# Patient Record
Sex: FEMALE | Race: WHITE | NOT HISPANIC OR LATINO | URBAN - METROPOLITAN AREA
[De-identification: names, ages, dates, MRNs, and addresses within clinical notes are randomized per-mention and may not be internally consistent; named-entity substitution may affect disease eponyms.]

---

## 2020-10-11 ENCOUNTER — INPATIENT (INPATIENT)
Facility: HOSPITAL | Age: 86
LOS: 2 days | Discharge: NURSING, REHAB OR LONG-TERM CARE | DRG: 536 | End: 2020-10-14
Attending: INTERNAL MEDICINE | Admitting: INTERNAL MEDICINE
Payer: MEDICARE

## 2020-10-11 VITALS
OXYGEN SATURATION: 96 % | HEIGHT: 62 IN | DIASTOLIC BLOOD PRESSURE: 76 MMHG | HEART RATE: 76 BPM | RESPIRATION RATE: 16 BRPM | SYSTOLIC BLOOD PRESSURE: 156 MMHG | WEIGHT: 134.92 LBS | TEMPERATURE: 98 F

## 2020-10-11 DIAGNOSIS — M25.552 PAIN IN LEFT HIP: ICD-10-CM

## 2020-10-11 LAB
ALBUMIN SERPL ELPH-MCNC: 4.2 G/DL — SIGNIFICANT CHANGE UP (ref 3.3–5)
ALP SERPL-CCNC: 75 U/L — SIGNIFICANT CHANGE UP (ref 40–120)
ALT FLD-CCNC: 18 U/L — SIGNIFICANT CHANGE UP (ref 10–45)
ANION GAP SERPL CALC-SCNC: 12 MMOL/L — SIGNIFICANT CHANGE UP (ref 5–17)
APPEARANCE UR: CLEAR — SIGNIFICANT CHANGE UP
APTT BLD: 27.6 SEC — SIGNIFICANT CHANGE UP (ref 27.5–35.5)
AST SERPL-CCNC: 23 U/L — SIGNIFICANT CHANGE UP (ref 10–40)
BASE EXCESS BLDV CALC-SCNC: 1.3 MMOL/L — SIGNIFICANT CHANGE UP (ref -2–2)
BASOPHILS # BLD AUTO: 0.02 K/UL — SIGNIFICANT CHANGE UP (ref 0–0.2)
BASOPHILS NFR BLD AUTO: 0.2 % — SIGNIFICANT CHANGE UP (ref 0–2)
BILIRUB SERPL-MCNC: 0.5 MG/DL — SIGNIFICANT CHANGE UP (ref 0.2–1.2)
BILIRUB UR-MCNC: NEGATIVE — SIGNIFICANT CHANGE UP
BUN SERPL-MCNC: 22 MG/DL — SIGNIFICANT CHANGE UP (ref 7–23)
CA-I SERPL-SCNC: 1.18 MMOL/L — SIGNIFICANT CHANGE UP (ref 1.12–1.3)
CALCIUM SERPL-MCNC: 9.3 MG/DL — SIGNIFICANT CHANGE UP (ref 8.4–10.5)
CHLORIDE BLDV-SCNC: 109 MMOL/L — HIGH (ref 96–108)
CHLORIDE SERPL-SCNC: 105 MMOL/L — SIGNIFICANT CHANGE UP (ref 96–108)
CK MB BLD-MCNC: 2.9 % — SIGNIFICANT CHANGE UP (ref 0–3.5)
CK MB CFR SERPL CALC: 3 NG/ML — SIGNIFICANT CHANGE UP (ref 0–3.8)
CK SERPL-CCNC: 102 U/L — SIGNIFICANT CHANGE UP (ref 25–170)
CO2 BLDV-SCNC: 27 MMOL/L — SIGNIFICANT CHANGE UP (ref 22–30)
CO2 SERPL-SCNC: 23 MMOL/L — SIGNIFICANT CHANGE UP (ref 22–31)
COLOR SPEC: SIGNIFICANT CHANGE UP
CREAT SERPL-MCNC: 1.27 MG/DL — SIGNIFICANT CHANGE UP (ref 0.5–1.3)
DIFF PNL FLD: NEGATIVE — SIGNIFICANT CHANGE UP
EOSINOPHIL # BLD AUTO: 0 K/UL — SIGNIFICANT CHANGE UP (ref 0–0.5)
EOSINOPHIL NFR BLD AUTO: 0 % — SIGNIFICANT CHANGE UP (ref 0–6)
ETHANOL SERPL-MCNC: SIGNIFICANT CHANGE UP MG/DL (ref 0–10)
GAS PNL BLDV: 137 MMOL/L — SIGNIFICANT CHANGE UP (ref 135–145)
GAS PNL BLDV: SIGNIFICANT CHANGE UP
GLUCOSE BLDV-MCNC: 128 MG/DL — HIGH (ref 70–99)
GLUCOSE SERPL-MCNC: 133 MG/DL — HIGH (ref 70–99)
GLUCOSE UR QL: NEGATIVE — SIGNIFICANT CHANGE UP
HCO3 BLDV-SCNC: 25 MMOL/L — SIGNIFICANT CHANGE UP (ref 21–29)
HCT VFR BLD CALC: 39.7 % — SIGNIFICANT CHANGE UP (ref 34.5–45)
HCT VFR BLDA CALC: 42 % — SIGNIFICANT CHANGE UP (ref 39–50)
HGB BLD CALC-MCNC: 13.5 G/DL — SIGNIFICANT CHANGE UP (ref 11.5–15.5)
HGB BLD-MCNC: 12.9 G/DL — SIGNIFICANT CHANGE UP (ref 11.5–15.5)
IMM GRANULOCYTES NFR BLD AUTO: 0.4 % — SIGNIFICANT CHANGE UP (ref 0–1.5)
INR BLD: 1.16 RATIO — SIGNIFICANT CHANGE UP (ref 0.88–1.16)
KETONES UR-MCNC: NEGATIVE — SIGNIFICANT CHANGE UP
LACTATE BLDV-MCNC: 1.3 MMOL/L — SIGNIFICANT CHANGE UP (ref 0.7–2)
LEUKOCYTE ESTERASE UR-ACNC: NEGATIVE — SIGNIFICANT CHANGE UP
LIDOCAIN IGE QN: 24 U/L — SIGNIFICANT CHANGE UP (ref 7–60)
LYMPHOCYTES # BLD AUTO: 17.8 % — SIGNIFICANT CHANGE UP (ref 13–44)
LYMPHOCYTES # BLD AUTO: 2.1 K/UL — SIGNIFICANT CHANGE UP (ref 1–3.3)
MCHC RBC-ENTMCNC: 28.4 PG — SIGNIFICANT CHANGE UP (ref 27–34)
MCHC RBC-ENTMCNC: 32.5 GM/DL — SIGNIFICANT CHANGE UP (ref 32–36)
MCV RBC AUTO: 87.4 FL — SIGNIFICANT CHANGE UP (ref 80–100)
MONOCYTES # BLD AUTO: 0.71 K/UL — SIGNIFICANT CHANGE UP (ref 0–0.9)
MONOCYTES NFR BLD AUTO: 6 % — SIGNIFICANT CHANGE UP (ref 2–14)
NEUTROPHILS # BLD AUTO: 8.91 K/UL — HIGH (ref 1.8–7.4)
NEUTROPHILS NFR BLD AUTO: 75.6 % — SIGNIFICANT CHANGE UP (ref 43–77)
NITRITE UR-MCNC: NEGATIVE — SIGNIFICANT CHANGE UP
NRBC # BLD: 0 /100 WBCS — SIGNIFICANT CHANGE UP (ref 0–0)
OTHER CELLS CSF MANUAL: 15 ML/DL — LOW (ref 18–22)
PCO2 BLDV: 40 MMHG — SIGNIFICANT CHANGE UP (ref 35–50)
PH BLDV: 7.42 — SIGNIFICANT CHANGE UP (ref 7.35–7.45)
PH UR: 6.5 — SIGNIFICANT CHANGE UP (ref 5–8)
PLATELET # BLD AUTO: 164 K/UL — SIGNIFICANT CHANGE UP (ref 150–400)
PO2 BLDV: 48 MMHG — HIGH (ref 25–45)
POTASSIUM BLDV-SCNC: 3.9 MMOL/L — SIGNIFICANT CHANGE UP (ref 3.5–5.3)
POTASSIUM SERPL-MCNC: 4.1 MMOL/L — SIGNIFICANT CHANGE UP (ref 3.5–5.3)
POTASSIUM SERPL-SCNC: 4.1 MMOL/L — SIGNIFICANT CHANGE UP (ref 3.5–5.3)
PROT SERPL-MCNC: 6.4 G/DL — SIGNIFICANT CHANGE UP (ref 6–8.3)
PROT UR-MCNC: NEGATIVE — SIGNIFICANT CHANGE UP
PROTHROM AB SERPL-ACNC: 13.8 SEC — HIGH (ref 10.6–13.6)
RBC # BLD: 4.54 M/UL — SIGNIFICANT CHANGE UP (ref 3.8–5.2)
RBC # FLD: 13.6 % — SIGNIFICANT CHANGE UP (ref 10.3–14.5)
SAO2 % BLDV: 82 % — SIGNIFICANT CHANGE UP (ref 67–88)
SARS-COV-2 RNA SPEC QL NAA+PROBE: SIGNIFICANT CHANGE UP
SODIUM SERPL-SCNC: 140 MMOL/L — SIGNIFICANT CHANGE UP (ref 135–145)
SP GR SPEC: 1.01 — SIGNIFICANT CHANGE UP (ref 1.01–1.02)
TROPONIN T, HIGH SENSITIVITY RESULT: 22 NG/L — SIGNIFICANT CHANGE UP (ref 0–51)
UROBILINOGEN FLD QL: NEGATIVE — SIGNIFICANT CHANGE UP
WBC # BLD: 11.79 K/UL — HIGH (ref 3.8–10.5)
WBC # FLD AUTO: 11.79 K/UL — HIGH (ref 3.8–10.5)

## 2020-10-11 PROCEDURE — 99285 EMERGENCY DEPT VISIT HI MDM: CPT

## 2020-10-11 PROCEDURE — 76705 ECHO EXAM OF ABDOMEN: CPT | Mod: 26

## 2020-10-11 PROCEDURE — 71045 X-RAY EXAM CHEST 1 VIEW: CPT | Mod: 26

## 2020-10-11 PROCEDURE — 74176 CT ABD & PELVIS W/O CONTRAST: CPT | Mod: 26

## 2020-10-11 PROCEDURE — 99223 1ST HOSP IP/OBS HIGH 75: CPT

## 2020-10-11 PROCEDURE — 76377 3D RENDER W/INTRP POSTPROCES: CPT | Mod: 26

## 2020-10-11 PROCEDURE — 71250 CT THORAX DX C-: CPT | Mod: 26

## 2020-10-11 PROCEDURE — 73523 X-RAY EXAM HIPS BI 5/> VIEWS: CPT | Mod: 26

## 2020-10-11 PROCEDURE — 72192 CT PELVIS W/O DYE: CPT | Mod: 26,59

## 2020-10-11 PROCEDURE — 73552 X-RAY EXAM OF FEMUR 2/>: CPT | Mod: 26,LT

## 2020-10-11 RX ORDER — EZETIMIBE 10 MG/1
0 TABLET ORAL
Qty: 0 | Refills: 3 | DISCHARGE

## 2020-10-11 RX ORDER — SODIUM CHLORIDE 9 MG/ML
1000 INJECTION INTRAMUSCULAR; INTRAVENOUS; SUBCUTANEOUS ONCE
Refills: 0 | Status: COMPLETED | OUTPATIENT
Start: 2020-10-11 | End: 2020-10-11

## 2020-10-11 RX ORDER — ACETAMINOPHEN 500 MG
650 TABLET ORAL ONCE
Refills: 0 | Status: COMPLETED | OUTPATIENT
Start: 2020-10-11 | End: 2020-10-11

## 2020-10-11 RX ORDER — ROSUVASTATIN CALCIUM 5 MG/1
0 TABLET ORAL
Qty: 0 | Refills: 0 | DISCHARGE

## 2020-10-11 RX ORDER — AMLODIPINE BESYLATE 2.5 MG/1
0 TABLET ORAL
Qty: 0 | Refills: 1 | DISCHARGE

## 2020-10-11 RX ORDER — ACETAMINOPHEN 500 MG
650 TABLET ORAL EVERY 6 HOURS
Refills: 0 | Status: DISCONTINUED | OUTPATIENT
Start: 2020-10-11 | End: 2020-10-14

## 2020-10-11 RX ORDER — ACETAMINOPHEN 500 MG
975 TABLET ORAL ONCE
Refills: 0 | Status: COMPLETED | OUTPATIENT
Start: 2020-10-11 | End: 2020-10-11

## 2020-10-11 RX ORDER — AMIODARONE HYDROCHLORIDE 400 MG/1
0 TABLET ORAL
Qty: 0 | Refills: 0 | DISCHARGE

## 2020-10-11 RX ORDER — AMIODARONE HYDROCHLORIDE 400 MG/1
200 TABLET ORAL DAILY
Refills: 0 | Status: DISCONTINUED | OUTPATIENT
Start: 2020-10-11 | End: 2020-10-12

## 2020-10-11 RX ORDER — EZETIMIBE 10 MG/1
1 TABLET ORAL
Qty: 0 | Refills: 3 | DISCHARGE

## 2020-10-11 RX ORDER — OLMESARTAN MEDOXOMIL 5 MG/1
0 TABLET, FILM COATED ORAL
Qty: 0 | Refills: 3 | DISCHARGE

## 2020-10-11 RX ADMIN — SODIUM CHLORIDE 1000 MILLILITER(S): 9 INJECTION INTRAMUSCULAR; INTRAVENOUS; SUBCUTANEOUS at 21:58

## 2020-10-11 RX ADMIN — SODIUM CHLORIDE 1000 MILLILITER(S): 9 INJECTION INTRAMUSCULAR; INTRAVENOUS; SUBCUTANEOUS at 19:00

## 2020-10-11 RX ADMIN — Medication 975 MILLIGRAM(S): at 14:55

## 2020-10-11 RX ADMIN — Medication 650 MILLIGRAM(S): at 22:38

## 2020-10-11 RX ADMIN — Medication 975 MILLIGRAM(S): at 20:22

## 2020-10-11 NOTE — H&P ADULT - PROBLEM SELECTOR PLAN 3
Holding home BP medications for now given hypotension. Per pt's son had been recently changed to olmesartan so may be that this is too much for her to tolerate.  Continue amiodarone for ? arrythmia

## 2020-10-11 NOTE — ED PROVIDER NOTE - ATTENDING CONTRIBUTION TO CARE
------------ATTENDING NOTE------------   pt w/ son (easily translating) c/o mechanical fall sitting in chair, landed on her L buttock, no head injury/LOC or HA/AMS, c/o mild tenderness in L hip/buttock worse w/ active ROM, was unable to ambulate due to pain, no additional injuries/complaints, nml neuro exam and at her baseline status per son, CTL spine clinically cleared, benign stable chest w/o tenderness/crepitus/distress, soft benign abd, nml cardiac exam, equal distal pulses, no external signs of trauma, pelvis stable, L buttock/hip tender w/o deformity, nvi w/ bcr distally, son is physician and wants limited w/u, awaiting imaging and close reassessments -->  - Robert Kendall MD    --------------------------------------------- ------------ATTENDING NOTE------------   pt w/ son (easily translating) c/o mechanical fall sitting in chair, landed on her L buttock, no head injury/LOC or HA/AMS, c/o mild tenderness in L hip/buttock worse w/ active ROM, was unable to ambulate due to pain, no additional injuries/complaints, nml neuro exam and at her baseline status per son, CTL spine clinically cleared, benign stable chest w/o tenderness/crepitus/distress, soft benign abd, nml cardiac exam, equal distal pulses, no external signs of trauma, pelvis stable, L buttock/hip tender w/o deformity, nvi w/ bcr distally, son is physician and wants limited w/u, awaiting imaging and close reassessments --> ED SIgn Out 15:45, stable, pain improved, pending imaging and reassessments for dispo (son would like to take home if no operative intervention.  - Robert Kendall MD    ---------------------------------------------

## 2020-10-11 NOTE — H&P ADULT - PROBLEM SELECTOR PLAN 2
Had episode of hypotension in setting of significant pain with attempting to ambulate. However, did also report possibile lightheadedness immediately prior to the fall. Has been having episodes of weakness/lightheadedness for some time which she has been undergoing workup with her cardiologist with negative findings to date. Unclear if this represents a vasovagal episode vs arrythmia vs orthostatic hypotension as patient had attempted to get up to leave.  Monitor on telemetry for now  No symptoms or imaging findings c/w acute infection and patient's UA clean, would doubt infectious etiology. Leukocytosis likely reactive to fracture.

## 2020-10-11 NOTE — ED PROVIDER NOTE - NSFOLLOWUPINSTRUCTIONS_ED_ALL_ED_FT
You were found to have a fracture of your superior and inferior pubic rami.   You can take tylenol as needed for pain  Please follow up with the orthopedic doctor  Please return if you have worsening pain, difficulty walking, fevers or further concerns. You were found to have a fracture of your superior and inferior pubic rami.   You can take tylenol as needed for pain  Please follow up with the orthopedic doctor and your primary care doctor.  Please return if you have worsening pain, difficulty walking, fevers or further concerns.

## 2020-10-11 NOTE — H&P ADULT - ASSESSMENT
89yo F Hx HTN, HLD, GERD, IBS, s/p TAVR presenting with complaints of R hip pain s/p fall. Patient reports that immediately prior to fall she believes that she felt a little lightheaded but also thinks that she "missed" a chair she was trying to sit on

## 2020-10-11 NOTE — ED ADULT NURSE NOTE - OBJECTIVE STATEMENT
89 Year old female presents to the ED via wheelchair from waiting room with son s/p mechanical fall while attempting to sit in chair, complaining of L hip pain. PMH HTN, HLD, tavr 4 years ago. Pt. denies taking anything for pain prior to arrival. Pt. is A&Ox4, states pain is only in L hip. Denies dizziness, fever/chills, chest pain, SOB, nausea, vomiting, recent sick contacts. Denies dysuria. Patient undressed and placed into gown, call bell in hand and side rails up with bed in lowest position for safety. blanket provided. Comfort and safety provided.

## 2020-10-11 NOTE — ED PROVIDER NOTE - PMH
Fx distal radius NEC-closed    GERD (gastroesophageal reflux disease)    Hyperlipidemia    Hypertension    IBS (irritable bowel syndrome)

## 2020-10-11 NOTE — ED PROVIDER NOTE - CARE PROVIDER_API CALL
Aniket Darnell  ORTHOPAEDIC SURGERY  [address], [address]  [place] [address]  Phone: [phone]  Fax: [phone]  Follow Up Time:

## 2020-10-11 NOTE — H&P ADULT - PROBLEM SELECTOR PLAN 1
2/2 pubic rami fractures  Pain control with tylenol-reports reasonably good pain control with this though did have significant pain when attempting to ambulate earlier  Patient and family reluctant to use stronger pain medications at this time so will hold off  PT consult

## 2020-10-11 NOTE — ED PROVIDER NOTE - PROGRESS NOTE DETAILS
jodee kaufman PGY2: pt with mild improvement of pain, xrays negative. will obtain CT to better evaluate the pelvis. discussed with patient and son who are in agreement. Attending Mariya Melgoza: pt signed out to me. ct shows evidence of superior and inferior pubic rami fracture. d/w family who wants to take pt home has help and feels can care for her. will attempt to ambulate Attending Mariya Melgoza: pt with some difficulty wit nadine. d/w pt admission for PT and monitoring. pt and family prefer to take her home and will have someone with her. understand can return at anytime Attending Mariya Melgoza: pt was getting ready to leave and became lightheaded and diaphoretic and did not feel well. repeat blood pressure in 60's. placed on monitor. pocus performed showing hyperdynamic LVEF, flat ivc and no free fluid.started on ivf with improvement will send labs

## 2020-10-11 NOTE — H&P ADULT - HISTORY OF PRESENT ILLNESS
Patient interviewed with use of pacific  (Maltese) and additional collateral by son via phone at patient's request. 89yo F Hx HTN, HLD, GERD, IBS, s/p TAVR presenting with complaints of R hip pain s/p fall. Patient reports that immediately prior to fall she believes that she felt a little lightheaded but also thinks that she "missed" a chair she was trying to sit on. Additional collateral obtained from patient's son Dr. Paul Valverde. No known hx of any specific arrythmia but patient has been having episodes of fatigue and lightheadedness that have been occurring on and off for some time now. Had been started on amiodarone by her cardiologist prophylactically but these episodes had still recurred a few months ago. At that time patient had 72 hour holter monitoring done as well as an echocardiogram which did not reveal any findings to explain the patient's symptoms. Patient reports that she sometimes has vague chest discomfort that she cannot clarify.

## 2020-10-11 NOTE — H&P ADULT - NSHPPHYSICALEXAM_GEN_ALL_CORE
Patient refusing repeat physical exam at this time.    ER provider physical examination per records as below:   · CONSTITUTIONAL: Well appearing, awake, alert, oriented to person, place, time/situation and in no apparent distress.  · EYES: Clear bilaterally, pupils equal, round and reactive to light.  · CARDIAC: Normal rate, regular rhythm.  Heart sounds S1, S2.  No murmurs, rubs or gallops. no chest wall ttp  · RESPIRATORY: Breath sounds clear and equal bilaterally.  · GASTROINTESTINAL: Abdomen soft, non-tender, no rebound or guarding.  · MUSCULOSKELETAL: Spine appears normal, decreased ROM of the L hip 2/2 pain, mild tenderness around the hip/buttock, no deformities or crepitus noted. 2+ pulses, sensation intact. no redness or bruising present.  · NEUROLOGICAL: Alert and oriented, no focal deficits, no motor or sensory deficits.  · SKIN: Skin normal color for race, warm, dry and intact. No evidence of rash.

## 2020-10-11 NOTE — H&P ADULT - NSICDXPASTSURGICALHX_GEN_ALL_CORE_FT
PAST SURGICAL HISTORY:  H/O partial thyroidectomy 1970 benign nodules    knee replacement right 2006    S/P TAVR (transcatheter aortic valve replacement)

## 2020-10-11 NOTE — ED PROVIDER NOTE - OBJECTIVE STATEMENT
89yo F 89yo F Hx HTN, HLD, GERD presenting with complaints of 89yo F Hx HTN, HLD, GERD presenting with complaints of R hip pain s/p fall. pt went to sit down in her chair and she instead landed on the ground. reporting pain in her R groin 89yo F Hx HTN, HLD, GERD presenting with complaints of R hip pain s/p fall. pt went to sit down in her chair and she instead landed on the ground. reporting pain in her R groin, unable to ambulate 2/2 pain. no other complaints, didn't hit her head, no LOC. no blood thinners. no back or neck pain. denies sob, cp, abd pain, n/v.

## 2020-10-11 NOTE — ED PROVIDER NOTE - PATIENT PORTAL LINK FT
You can access the FollowMyHealth Patient Portal offered by [facility] by registering at the following website: [email or web address]. By joining [employer]’s FollowMyHealth portal, you will also be able to view your health information using other applications (apps) compatible with our system.

## 2020-10-11 NOTE — ED ADULT NURSE NOTE - NSIMPLEMENTINTERV_GEN_ALL_ED
Implemented All Fall with Harm Risk Interventions:  [place]ang to call system. Call bell, personal items and telephone within reach. Instruct patient to call for assistance. Room bathroom lighting operational. Non-slip footwear when patient is off stretcher. Physically safe environment: no spills, clutter or unnecessary equipment. Stretcher in lowest position, wheels locked, appropriate side rails in place. Provide visual cue, wrist band, yellow gown, etc. Monitor gait and stability. Monitor for mental status changes and reorient to person, place, and time. Review medications for side effects contributing to fall risk. Reinforce activity limits and safety measures with patient and family. Provide visual clues: red socks.

## 2020-10-11 NOTE — H&P ADULT - NSHPLABSRESULTS_GEN_ALL_CORE
Labs personally reviewed:                        12.9   11.79 )-----------( 164      ( 11 Oct 2020 18:42 )             39.7       10-11    140  |  105  |  22  ----------------------------<  133<H>  4.1   |  23  |  1.27    Ca    9.3      11 Oct 2020 18:42  Mg     1.8     10-11    TPro  6.4  /  Alb  4.2  /  TBili  0.5  /  DBili  x   /  AST  23  /  ALT  18  /  AlkPhos  75  10-11      CARDIAC MARKERS ( 11 Oct 2020 19:15 )  x     / x     / 102 U/L / x     / 3.0 ng/mL        LIVER FUNCTIONS - ( 11 Oct 2020 18:42 )  Alb: 4.2 g/dL / Pro: 6.4 g/dL / ALK PHOS: 75 U/L / ALT: 18 U/L / AST: 23 U/L / GGT: x             PT/INR - ( 11 Oct 2020 19:15 )   PT: 13.8 sec;   INR: 1.16 ratio         PTT - ( 11 Oct 2020 19:15 )  PTT:27.6 sec    Urinalysis Basic - ( 11 Oct 2020 19:28 )    Color: Light Yellow / Appearance: Clear / S.010 / pH: x  Gluc: x / Ketone: Negative  / Bili: Negative / Urobili: Negative   Blood: x / Protein: Negative / Nitrite: Negative   Leuk Esterase: Negative / RBC: x / WBC x   Sq Epi: x / Non Sq Epi: x / Bacteria: x    Imaging reviewed: Slightly displaced fractures of the left superior and inferior pubic rami.     EKG:

## 2020-10-11 NOTE — ED PROVIDER NOTE - MUSCULOSKELETAL, MLM
Spine appears normal, decreased ROM of the L hip 2/2 pain, mild tenderness around the hip/buttock, no deformities or crepitus noted. 2+ pulses, sensation intact. no redness or bruising present.

## 2020-10-11 NOTE — ED ADULT NURSE REASSESSMENT NOTE - NS ED NURSE REASSESS COMMENT FT1
New diaper and janene placed under PT. Side rails up. Cardiac monitor in place. Son at bedside.
PT returned form CT scan. Mahnaz fit attached to PT and suction.
ED Resident Adilene made aware PT has increased left hip pain.
Patient is able to bear weight with assistance. MD discussed with patient and patient's son results of xrays and CTs. MD discussed with son at bedside that patient will need assistance at home. Son states he will have walker and wheelchair ready at home and will be caring for her. Patient and Patient's son state readiness for discharge.
PT states she feels comfortable.
Report received from ELIJAH Lamas. PT A/O x3. PT son at bedside. Cardiac monitor placed. VS within normal limits. Call bell at bedside.

## 2020-10-12 DIAGNOSIS — Z95.2 PRESENCE OF PROSTHETIC HEART VALVE: Chronic | ICD-10-CM

## 2020-10-12 DIAGNOSIS — M25.552 PAIN IN LEFT HIP: ICD-10-CM

## 2020-10-12 DIAGNOSIS — I10 ESSENTIAL (PRIMARY) HYPERTENSION: ICD-10-CM

## 2020-10-12 DIAGNOSIS — I95.9 HYPOTENSION, UNSPECIFIED: ICD-10-CM

## 2020-10-12 DIAGNOSIS — E78.5 HYPERLIPIDEMIA, UNSPECIFIED: ICD-10-CM

## 2020-10-12 LAB
ANION GAP SERPL CALC-SCNC: 11 MMOL/L — SIGNIFICANT CHANGE UP (ref 5–17)
BUN SERPL-MCNC: 19 MG/DL — SIGNIFICANT CHANGE UP (ref 7–23)
CALCIUM SERPL-MCNC: 9.7 MG/DL — SIGNIFICANT CHANGE UP (ref 8.4–10.5)
CHLORIDE SERPL-SCNC: 105 MMOL/L — SIGNIFICANT CHANGE UP (ref 96–108)
CO2 SERPL-SCNC: 24 MMOL/L — SIGNIFICANT CHANGE UP (ref 22–31)
CREAT SERPL-MCNC: 1.15 MG/DL — SIGNIFICANT CHANGE UP (ref 0.5–1.3)
GLUCOSE SERPL-MCNC: 101 MG/DL — HIGH (ref 70–99)
HCT VFR BLD CALC: 37.8 % — SIGNIFICANT CHANGE UP (ref 34.5–45)
HGB BLD-MCNC: 11.8 G/DL — SIGNIFICANT CHANGE UP (ref 11.5–15.5)
MCHC RBC-ENTMCNC: 27.8 PG — SIGNIFICANT CHANGE UP (ref 27–34)
MCHC RBC-ENTMCNC: 31.2 GM/DL — LOW (ref 32–36)
MCV RBC AUTO: 89.2 FL — SIGNIFICANT CHANGE UP (ref 80–100)
NRBC # BLD: 0 /100 WBCS — SIGNIFICANT CHANGE UP (ref 0–0)
PLATELET # BLD AUTO: 128 K/UL — LOW (ref 150–400)
POTASSIUM SERPL-MCNC: 4.1 MMOL/L — SIGNIFICANT CHANGE UP (ref 3.5–5.3)
POTASSIUM SERPL-SCNC: 4.1 MMOL/L — SIGNIFICANT CHANGE UP (ref 3.5–5.3)
RBC # BLD: 4.24 M/UL — SIGNIFICANT CHANGE UP (ref 3.8–5.2)
RBC # FLD: 13.6 % — SIGNIFICANT CHANGE UP (ref 10.3–14.5)
SARS-COV-2 IGG SERPL QL IA: NEGATIVE — SIGNIFICANT CHANGE UP
SARS-COV-2 IGM SERPL IA-ACNC: <3.8 AU/ML — SIGNIFICANT CHANGE UP
SODIUM SERPL-SCNC: 140 MMOL/L — SIGNIFICANT CHANGE UP (ref 135–145)
WBC # BLD: 6.88 K/UL — SIGNIFICANT CHANGE UP (ref 3.8–10.5)
WBC # FLD AUTO: 6.88 K/UL — SIGNIFICANT CHANGE UP (ref 3.8–10.5)

## 2020-10-12 RX ORDER — AMLODIPINE BESYLATE 2.5 MG/1
2.5 TABLET ORAL ONCE
Refills: 0 | Status: COMPLETED | OUTPATIENT
Start: 2020-10-12 | End: 2020-10-12

## 2020-10-12 RX ORDER — SIMETHICONE 80 MG/1
1 TABLET, CHEWABLE ORAL
Qty: 0 | Refills: 0 | DISCHARGE

## 2020-10-12 RX ORDER — LOSARTAN POTASSIUM 100 MG/1
50 TABLET, FILM COATED ORAL DAILY
Refills: 0 | Status: DISCONTINUED | OUTPATIENT
Start: 2020-10-12 | End: 2020-10-14

## 2020-10-12 RX ORDER — SIMETHICONE 80 MG/1
80 TABLET, CHEWABLE ORAL
Refills: 0 | Status: DISCONTINUED | OUTPATIENT
Start: 2020-10-12 | End: 2020-10-14

## 2020-10-12 RX ORDER — POLYETHYLENE GLYCOL 3350 17 G/17G
17 POWDER, FOR SOLUTION ORAL DAILY
Refills: 0 | Status: DISCONTINUED | OUTPATIENT
Start: 2020-10-12 | End: 2020-10-14

## 2020-10-12 RX ORDER — AMIODARONE HYDROCHLORIDE 400 MG/1
1 TABLET ORAL
Qty: 0 | Refills: 0 | DISCHARGE

## 2020-10-12 RX ORDER — AMLODIPINE BESYLATE 2.5 MG/1
1 TABLET ORAL
Qty: 0 | Refills: 1 | DISCHARGE

## 2020-10-12 RX ORDER — OLMESARTAN MEDOXOMIL 5 MG/1
1 TABLET, FILM COATED ORAL
Qty: 0 | Refills: 3 | DISCHARGE

## 2020-10-12 RX ORDER — AMIODARONE HYDROCHLORIDE 400 MG/1
0.5 TABLET ORAL
Qty: 0 | Refills: 0 | DISCHARGE

## 2020-10-12 RX ORDER — ASPIRIN/CALCIUM CARB/MAGNESIUM 324 MG
1 TABLET ORAL
Qty: 0 | Refills: 0 | DISCHARGE

## 2020-10-12 RX ORDER — AMIODARONE HYDROCHLORIDE 400 MG/1
100 TABLET ORAL DAILY
Refills: 0 | Status: DISCONTINUED | OUTPATIENT
Start: 2020-10-12 | End: 2020-10-14

## 2020-10-12 RX ORDER — ATORVASTATIN CALCIUM 80 MG/1
20 TABLET, FILM COATED ORAL AT BEDTIME
Refills: 0 | Status: DISCONTINUED | OUTPATIENT
Start: 2020-10-12 | End: 2020-10-13

## 2020-10-12 RX ORDER — HEPARIN SODIUM 5000 [USP'U]/ML
5000 INJECTION INTRAVENOUS; SUBCUTANEOUS EVERY 12 HOURS
Refills: 0 | Status: DISCONTINUED | OUTPATIENT
Start: 2020-10-12 | End: 2020-10-14

## 2020-10-12 RX ORDER — ROSUVASTATIN CALCIUM 5 MG/1
1 TABLET ORAL
Qty: 0 | Refills: 0 | DISCHARGE

## 2020-10-12 RX ORDER — ASPIRIN/CALCIUM CARB/MAGNESIUM 324 MG
81 TABLET ORAL DAILY
Refills: 0 | Status: DISCONTINUED | OUTPATIENT
Start: 2020-10-12 | End: 2020-10-14

## 2020-10-12 RX ORDER — LIDOCAINE 4 G/100G
1 CREAM TOPICAL ONCE
Refills: 0 | Status: COMPLETED | OUTPATIENT
Start: 2020-10-12 | End: 2020-10-12

## 2020-10-12 RX ORDER — LINACLOTIDE 145 UG/1
1 CAPSULE, GELATIN COATED ORAL
Qty: 0 | Refills: 0 | DISCHARGE

## 2020-10-12 RX ADMIN — LIDOCAINE 1 PATCH: 4 CREAM TOPICAL at 20:58

## 2020-10-12 RX ADMIN — ATORVASTATIN CALCIUM 20 MILLIGRAM(S): 80 TABLET, FILM COATED ORAL at 20:58

## 2020-10-12 RX ADMIN — AMLODIPINE BESYLATE 2.5 MILLIGRAM(S): 2.5 TABLET ORAL at 20:58

## 2020-10-12 RX ADMIN — Medication 650 MILLIGRAM(S): at 21:30

## 2020-10-12 RX ADMIN — Medication 650 MILLIGRAM(S): at 20:58

## 2020-10-12 RX ADMIN — AMIODARONE HYDROCHLORIDE 100 MILLIGRAM(S): 400 TABLET ORAL at 06:12

## 2020-10-12 RX ADMIN — LOSARTAN POTASSIUM 50 MILLIGRAM(S): 100 TABLET, FILM COATED ORAL at 20:58

## 2020-10-12 RX ADMIN — SIMETHICONE 80 MILLIGRAM(S): 80 TABLET, CHEWABLE ORAL at 20:58

## 2020-10-12 RX ADMIN — HEPARIN SODIUM 5000 UNIT(S): 5000 INJECTION INTRAVENOUS; SUBCUTANEOUS at 05:19

## 2020-10-12 RX ADMIN — Medication 81 MILLIGRAM(S): at 13:28

## 2020-10-12 NOTE — PROGRESS NOTE ADULT - ASSESSMENT
89yo Female Hx HTN, HLD, GERD, IBS, s/p TAVR presenting with complaints of R hip pain s/p fall.       Problem/Plan - 1:  ·  Problem: Hip pain, left.  Plan: 2/2 pubic rami fractures  analgesics  pt  ortho eval  wbat     Problem/Plan - 2:  ·  Problem: Hypotension,/vasovagal while trying to get  up sec to pain       Problem/Plan - 3:  ·  Problem: Essential hypertension.   c/w htn meds and monitor     Problem/Plan - 4:  ·  Problem: Hyperlipidemia, unspecified hyperlipidemia type.  c/w meds      dvt proph

## 2020-10-12 NOTE — PHYSICAL THERAPY INITIAL EVALUATION ADULT - PERTINENT HX OF CURRENT PROBLEM, REHAB EVAL
Pt is a 89 y/o female admitted to [facility] on 10/11/20  Hx HTN, HLD, GERD, IBS, s/p TAVR presenting with complaints of R hip pain s/p fall. Patient reports that immediately prior to fall she believes that she felt a little lightheaded but also thinks that she "missed" a chair she was trying to sit on.

## 2020-10-12 NOTE — PATIENT PROFILE ADULT - PATIENT/CAREGIVER OFFERED  INTERPRETER SERVICES
yes Urine Pregnancy Test Result: negative Lot # (Optional): [id number] Detail Level: None Performed By: Marilyn Parsons MA Expiration Date (Optional): 9/30/2020

## 2020-10-12 NOTE — PROGRESS NOTE ADULT - SUBJECTIVE AND OBJECTIVE BOX
CHIEF COMPLAINT:Patient is a 89y old  Female who presents with a chief complaint of Hypotension, pubic rami fractures (11 Oct 2020 21:16)    	        PAST MEDICAL & SURGICAL HISTORY:  Hyperlipidemia    Hypertension    GERD (gastroesophageal reflux disease)    IBS (irritable bowel syndrome)    Fx distal radius NEC-closed    S/P TAVR (transcatheter aortic valve replacement)    H/O partial thyroidectomy  1970 benign nodules    knee replacement  right 2006            REVIEW OF SYSTEMS:  CONSTITUTIONAL: No fever, weight loss, or fatigue  EYES: No eye pain, visual disturbances, or discharge  NECK: No pain or stiffness  RESPIRATORY: No cough, wheezing, chills or hemoptysis; No Shortness of Breath  CARDIOVASCULAR: No chest pain, palpitations, passing out, dizziness, or leg swelling  GASTROINTESTINAL: No abdominal or epigastric pain. No nausea, vomiting, or hematemesis; No diarrhea or constipation. No melena or hematochezia.  GENITOURINARY: No dysuria, frequency, hematuria, or incontinence  NEUROLOGICAL: No headaches, memory loss,   c/o l groin pain     Medications:  MEDICATIONS  (STANDING):  aMIOdarone    Tablet 100 milliGRAM(s) Oral daily  aspirin enteric coated 81 milliGRAM(s) Oral daily  heparin   Injectable 5000 Unit(s) SubCutaneous every 12 hours    MEDICATIONS  (PRN):  acetaminophen   Tablet .. 650 milliGRAM(s) Oral every 6 hours PRN Mild Pain (1 - 3), Moderate Pain (4 - 6)    	    PHYSICAL EXAM:  T(C): 36.7 (10-12-20 @ 08:33), Max: 36.9 (10-11-20 @ 14:14)  HR: 77 (10-12-20 @ 08:33) (72 - 84)  BP: 147/68 (10-12-20 @ 08:33) (62/35 - 160/72)  RR: 18 (10-12-20 @ 08:33) (14 - 22)  SpO2: 96% (10-12-20 @ 08:33) (93% - 100%)  Wt(kg): --  I&O's Summary      Appearance: Normal	  HEENT:   Normal oral mucosa, PERRL, EOMI	  Lymphatic: No lymphadenopathy  Cardiovascular: Normal S1 S2, No JVD, No murmurs, No edema  Respiratory: Lungs clear to auscultation	  Psychiatry: A & O x 3, Mood & affect appropriate  Gastrointestinal:  Soft, Non-tender, + BS	  Skin: No rashes, No ecchymoses, No cyanosis	  Neurologic: Non-focal  Extremities: dec rom llex sec to pain groin   TELEMETRY: 	    ECG:  	  RADIOLOGY:  OTHER: 	  	  LABS:	 	    CARDIAC MARKERS:  CARDIAC MARKERS ( 11 Oct 2020 19:15 )  x     / x     / 102 U/L / x     / 3.0 ng/mL                                11.8   6.88  )-----------( 128      ( 12 Oct 2020 05:28 )             37.8     10-12    140  |  105  |  19  ----------------------------<  101<H>  4.1   |  24  |  1.15    Ca    9.7      12 Oct 2020 05:28  Mg     1.8     10-11    TPro  6.4  /  Alb  4.2  /  TBili  0.5  /  DBili  x   /  AST  23  /  ALT  18  /  AlkPhos  75  10-11    proBNP:   Lipid Profile:   HgA1c:   TSH:

## 2020-10-12 NOTE — PHYSICAL THERAPY INITIAL EVALUATION ADULT - ADDITIONAL COMMENTS
Pt lives alone in  a private house with one flight of steps to enter. Pt was Ind with all ADLs and amb without AD.

## 2020-10-12 NOTE — PHYSICAL THERAPY INITIAL EVALUATION ADULT - PRECAUTIONS/LIMITATIONS, REHAB EVAL
No known hx of any specific arrythmia but patient has been having episodes of fatigue and lightheadedness that have been occurring on and off for some time now. Had been started on amiodarone by her cardiologist prophylactically but these episodes had still recurred a few months ago. At that time patient had 72 hour holter monitoring done as well as an echocardiogram which did not reveal any findings to explain the patient's symptoms. Patient reports that she sometimes has vague chest discomfort that she cannot clarify.  CT pelvis:  Slightly displaced fractures of the left superior and inferior pubic rami.

## 2020-10-12 NOTE — CONSULT NOTE ADULT - SUBJECTIVE AND OBJECTIVE BOX
89y Female admitted to [facility] s/p syncopal fall c/o L groin pain.  Localizes pain deep in the left groin. Patient denies radiation of pain. Patient denies numbness/tingling/burning in the LLE. No other bone/joint complaints. Patient is a community ambulator at baseline with assistive devices. Patient has no issues w/ ADLs/IADLs.     PAST MEDICAL & SURGICAL HISTORY:  Hyperlipidemia    Hypertension    GERD (gastroesophageal reflux disease)    IBS (irritable bowel syndrome)    Fx distal radius NEC-closed    S/P TAVR (transcatheter aortic valve replacement)    H/O partial thyroidectomy  1970 benign nodules    knee replacement  right 2006      MEDICATIONS  (STANDING):  aMIOdarone    Tablet 100 milliGRAM(s) Oral daily  amLODIPine   Tablet 2.5 milliGRAM(s) Oral once  aspirin enteric coated 81 milliGRAM(s) Oral daily  atorvastatin 20 milliGRAM(s) Oral at bedtime  heparin   Injectable 5000 Unit(s) SubCutaneous every 12 hours  lidocaine   Patch 1 Patch Transdermal once  losartan 50 milliGRAM(s) Oral daily  polyethylene glycol 3350 17 Gram(s) Oral daily  simethicone 80 milliGRAM(s) Chew two times a day    MEDICATIONS  (PRN):  acetaminophen   Tablet .. 650 milliGRAM(s) Oral every 6 hours PRN Mild Pain (1 - 3), Moderate Pain (4 - 6)    Allergies    iodine (Urticaria; Flushing)  shellfish (Urticaria; Flushing)    Intolerances        T(C): 37.3 (10-12-20 @ 20:04), Max: 37.3 (10-12-20 @ 20:04)  HR: 72 (10-12-20 @ 20:04) (70 - 79)  BP: 176/73 (10-12-20 @ 20:04) (147/68 - 177/75)  RR: 18 (10-12-20 @ 20:04) (14 - 19)  SpO2: 96% (10-12-20 @ 20:04) (95% - 100%)  Wt(kg): --    PE   LLE:  Skin intact; No ecchymosis/soft tissue swelling  Compartments soft; + TTP about groin. No TTP to knee/leg/ankle/foot   ROM limited 2/2 pain   Able to SLR; - Log Roll/Heel Strike  Motor intact GS/TA/FHL/EHL  SILT L2-S1  DP/PT pulses 2+    RLE/BUE:   No bony TTP; Good ROM w/o pain; Exam Unremarkable    Imaging:  XR demonstrating left superior and inferior pubic rami fractures.   CT demonstrating left superior and inferior rami fractures. No femur fracture noted. Possible sacral insufficiency fracture on the left side.     89F with L LC1 pelvic fracture.     No operative intervention required.   May be WBAT with a walker.   Encourage mobilization when pain improves.   PT/OT  DVT prophylaxis recommended  Pain control, limit narcotics.   FU with Dr. Arroyo in 3-4 weeks in the office. Call office for appt.

## 2020-10-13 RX ORDER — AMLODIPINE BESYLATE 2.5 MG/1
2.5 TABLET ORAL DAILY
Refills: 0 | Status: DISCONTINUED | OUTPATIENT
Start: 2020-10-13 | End: 2020-10-13

## 2020-10-13 RX ORDER — ROSUVASTATIN CALCIUM 5 MG/1
5 TABLET ORAL AT BEDTIME
Refills: 0 | Status: DISCONTINUED | OUTPATIENT
Start: 2020-10-13 | End: 2020-10-14

## 2020-10-13 RX ORDER — PANTOPRAZOLE SODIUM 20 MG/1
40 TABLET, DELAYED RELEASE ORAL DAILY
Refills: 0 | Status: DISCONTINUED | OUTPATIENT
Start: 2020-10-13 | End: 2020-10-14

## 2020-10-13 RX ORDER — AMLODIPINE BESYLATE 2.5 MG/1
2.5 TABLET ORAL DAILY
Refills: 0 | Status: DISCONTINUED | OUTPATIENT
Start: 2020-10-13 | End: 2020-10-14

## 2020-10-13 RX ADMIN — ROSUVASTATIN CALCIUM 5 MILLIGRAM(S): 5 TABLET ORAL at 21:47

## 2020-10-13 RX ADMIN — HEPARIN SODIUM 5000 UNIT(S): 5000 INJECTION INTRAVENOUS; SUBCUTANEOUS at 06:40

## 2020-10-13 RX ADMIN — LOSARTAN POTASSIUM 50 MILLIGRAM(S): 100 TABLET, FILM COATED ORAL at 08:44

## 2020-10-13 RX ADMIN — SIMETHICONE 80 MILLIGRAM(S): 80 TABLET, CHEWABLE ORAL at 06:39

## 2020-10-13 RX ADMIN — Medication 81 MILLIGRAM(S): at 11:50

## 2020-10-13 RX ADMIN — AMLODIPINE BESYLATE 2.5 MILLIGRAM(S): 2.5 TABLET ORAL at 06:40

## 2020-10-13 RX ADMIN — PANTOPRAZOLE SODIUM 40 MILLIGRAM(S): 20 TABLET, DELAYED RELEASE ORAL at 06:54

## 2020-10-13 RX ADMIN — SIMETHICONE 80 MILLIGRAM(S): 80 TABLET, CHEWABLE ORAL at 17:03

## 2020-10-13 RX ADMIN — LIDOCAINE 1 PATCH: 4 CREAM TOPICAL at 08:29

## 2020-10-13 RX ADMIN — HEPARIN SODIUM 5000 UNIT(S): 5000 INJECTION INTRAVENOUS; SUBCUTANEOUS at 17:04

## 2020-10-13 RX ADMIN — Medication 650 MILLIGRAM(S): at 21:47

## 2020-10-13 RX ADMIN — POLYETHYLENE GLYCOL 3350 17 GRAM(S): 17 POWDER, FOR SOLUTION ORAL at 11:50

## 2020-10-13 RX ADMIN — AMIODARONE HYDROCHLORIDE 100 MILLIGRAM(S): 400 TABLET ORAL at 06:39

## 2020-10-13 RX ADMIN — Medication 650 MILLIGRAM(S): at 22:10

## 2020-10-13 NOTE — PROGRESS NOTE ADULT - SUBJECTIVE AND OBJECTIVE BOX
CHIEF COMPLAINT:Patient is a 89y old  Female who presents with a chief complaint of Hypotension, pubic rami fractures (12 Oct 2020 20:53)    	        PAST MEDICAL & SURGICAL HISTORY:  Hyperlipidemia    Hypertension    GERD (gastroesophageal reflux disease)    IBS (irritable bowel syndrome)    Fx distal radius NEC-closed    S/P TAVR (transcatheter aortic valve replacement)    H/O partial thyroidectomy  1970 benign nodules    knee replacement  right 2006            weak  NECK: No pain or stiffness  RESPIRATORY: No cough, wheezing, chills or hemoptysis; No Shortness of Breath  CARDIOVASCULAR: No chest pain, palpitations, passing out, dizziness  GASTROINTESTINAL: No abdominal or epigastric pain. No nausea, vomiting, or hematemesis; No diarrhea or constipation. No melena or hematochezia.  GENITOURINARY: No dysuria, frequency, hematuria, or incontinence  NEUROLOGICAL: No headaches, memory loss,   pain groin are w/ movement    Medications:  MEDICATIONS  (STANDING):  aMIOdarone    Tablet 100 milliGRAM(s) Oral daily  amLODIPine   Tablet 2.5 milliGRAM(s) Oral daily  aspirin enteric coated 81 milliGRAM(s) Oral daily  atorvastatin 20 milliGRAM(s) Oral at bedtime  heparin   Injectable 5000 Unit(s) SubCutaneous every 12 hours  losartan 50 milliGRAM(s) Oral daily  pantoprazole    Tablet 40 milliGRAM(s) Oral daily  polyethylene glycol 3350 17 Gram(s) Oral daily  simethicone 80 milliGRAM(s) Chew two times a day    MEDICATIONS  (PRN):  acetaminophen   Tablet .. 650 milliGRAM(s) Oral every 6 hours PRN Mild Pain (1 - 3), Moderate Pain (4 - 6)    	    PHYSICAL EXAM:  T(C): 36.5 (10-13-20 @ 08:34), Max: 37.3 (10-12-20 @ 20:04)  HR: 68 (10-13-20 @ 08:34) (68 - 76)  BP: 152/78 (10-13-20 @ 08:34) (143/66 - 177/75)  RR: 18 (10-13-20 @ 08:34) (18 - 18)  SpO2: 94% (10-13-20 @ 08:34) (94% - 96%)  Wt(kg): --  I&O's Summary    12 Oct 2020 07:01  -  13 Oct 2020 07:00  --------------------------------------------------------  IN: 300 mL / OUT: 400 mL / NET: -100 mL    13 Oct 2020 07:01  -  13 Oct 2020 11:01  --------------------------------------------------------  IN: 240 mL / OUT: 0 mL / NET: 240 mL        Appearance: Normal	  HEENT:   Normal oral mucosa, PERRL, EOMI	  Lymphatic: No lymphadenopathy  Cardiovascular: Normal S1 S2, No JVD, No murmurs, No edema  Respiratory: Lungs clear to auscultation	  Psychiatry: A & O x 3, Mood & affect appropriate  Gastrointestinal:  Soft, Non-tender, + BS	  Skin: No rashes, No ecchymoses, No cyanosis	  Neurologic: Non-focal  Extremities: dec rom sec to pain in groin     TELEMETRY: 	    ECG:  	  RADIOLOGY:  OTHER: 	  	  LABS:	 	    CARDIAC MARKERS:  CARDIAC MARKERS ( 11 Oct 2020 19:15 )  x     / x     / 102 U/L / x     / 3.0 ng/mL                                11.8   6.88  )-----------( 128      ( 12 Oct 2020 05:28 )             37.8     10-12    140  |  105  |  19  ----------------------------<  101<H>  4.1   |  24  |  1.15    Ca    9.7      12 Oct 2020 05:28  Mg     1.8     10-11    TPro  6.4  /  Alb  4.2  /  TBili  0.5  /  DBili  x   /  AST  23  /  ALT  18  /  AlkPhos  75  10-11    proBNP:   Lipid Profile:   HgA1c:   TSH:

## 2020-10-13 NOTE — CHART NOTE - NSCHARTNOTEFT_GEN_A_CORE
Episodic Note    Notified by RN that patient with blood pressure of 176/63 mm/Hg @ 22:04 on 10/12/2020. Attending son at bedside, who him and the patient report that she did not receive her hypertensive medications on 10/12/2020. Patient at home takes Amlodipine 2.5 mg PO daily and Olmesartan Medoxomil 20 mg PO once a day. Patient seen and evaluated at bedside, in NAD. Patient asymptomatic otherwise.     Vital Signs Last 24 Hrs  T(C): 37.3 (12 Oct 2020 20:04), Max: 37.3 (12 Oct 2020 20:04)  T(F): 99.2 (12 Oct 2020 20:04), Max: 99.2 (12 Oct 2020 20:04)  HR: 71 (12 Oct 2020 22:08) (70 - 77)  BP: 143/66 (12 Oct 2020 22:08) (143/66 - 177/75)  RR: 18 (12 Oct 2020 20:04) (18 - 18)  SpO2: 96% (12 Oct 2020 20:04) (95% - 96%)    PHYSICAL EXAM:  Constitutional: alert   Cardiovascular: Normal S1 S2, No JVD, No murmurs, No edema  Respiratory: Lungs clear to auscultation	  Psychiatry: A & O x 3, Mood & affect appropriate  Gastrointestinal:  Soft, Non-tender	  Neurologic: Non-focal    ASSESSMENT/PLAN:  HPI: 89yo Female Hx HTN, HLD, GERD, IBS, s/p TAVR presenting with complaints of R hip pain s/p fall. Now presents with asymptomatic elevated blood pressure.    Impression: Asymptomatic elevated blood pressure   > Patient given her home dose of Amlodipine and the equivalent dose of Olmesartan Medoxomil, which is Losartan.  > On reassessment patients blood pressure improved to 143/66 mm/Hg.   > Continue current management plan.  > Continue to monitor patient and vitals closely.  > F/u with primary care team in AM.    Chen Govea PA-C  Department of Medicine  #[id number] Episodic Note    Notified by RN that patient with blood pressure of 176/63 mm/Hg @ 22:04 on 10/12/2020. Attending son at bedside, who him and the patient report that she did not receive her hypertensive medications on 10/12/2020. Patient at home takes Amlodipine 2.5 mg PO daily and Olmesartan Medoxomil 20 mg PO once a day. Patient seen and evaluated at bedside, in NAD. Patient asymptomatic otherwise.     Vital Signs Last 24 Hrs  T(C): 37.3 (12 Oct 2020 20:04), Max: 37.3 (12 Oct 2020 20:04)  T(F): 99.2 (12 Oct 2020 20:04), Max: 99.2 (12 Oct 2020 20:04)  HR: 71 (12 Oct 2020 22:08) (70 - 77)  BP: 143/66 (12 Oct 2020 22:08) (143/66 - 177/75)  RR: 18 (12 Oct 2020 20:04) (18 - 18)  SpO2: 96% (12 Oct 2020 20:04) (95% - 96%)    PHYSICAL EXAM:  Constitutional: alert   Cardiovascular: Normal S1 S2, No JVD, No murmurs, No edema  Respiratory: Lungs clear to auscultation	  Psychiatry: A & O x 3, Mood & affect appropriate  Gastrointestinal:  Soft, Non-tender	  Neurologic: Non-focal    ASSESSMENT/PLAN:  HPI: 89yo Female Hx HTN, HLD, GERD, IBS, s/p TAVR presenting with complaints of R hip pain s/p fall. Now presented with asymptomatic elevated blood pressure.    Impression: Asymptomatic elevated blood pressure   > Patient given her home dose of Amlodipine and the equivalent dose of Olmesartan Medoxomil, which is Losartan.  > On reassessment patients blood pressure improved to 143/66 mm/Hg.   > Continue current management plan.  > Continue to monitor patient and vitals closely.  > F/u with primary care team in AM.    Chen Govea PA-C  Department of Medicine  #[id number] Episodic Note    Cong[place]ese  services used for the encounter,  name [place], ID# [id number].     Notified by RN that patient with blood pressure of 176/63 mm/Hg @ 22:04 on 10/12/2020. Attending son at bedside, who him and the patient report that she did not receive her hypertensive medications on 10/12/2020. Patient at home takes Amlodipine 2.5 mg PO daily and Olmesartan Medoxomil 20 mg PO once a day. Patient seen and evaluated at bedside, in NAD. Patient c/o chronic constipation, but otherwise asymptomatic.     Vital Signs Last 24 Hrs  T(C): 37.3 (12 Oct 2020 20:04), Max: 37.3 (12 Oct 2020 20:04)  T(F): 99.2 (12 Oct 2020 20:04), Max: 99.2 (12 Oct 2020 20:04)  HR: 71 (12 Oct 2020 22:08) (70 - 77)  BP: 143/66 (12 Oct 2020 22:08) (143/66 - 177/75)  RR: 18 (12 Oct 2020 20:04) (18 - 18)  SpO2: 96% (12 Oct 2020 20:04) (95% - 96%)    PHYSICAL EXAM:  Constitutional: alert   Cardiovascular: Normal S1 S2, No JVD, No murmurs, No edema  Respiratory: Lungs clear to auscultation	  Psychiatry: A & O x 3, Mood & affect appropriate  Gastrointestinal:  Soft, Non-tender	  Neurologic: Non-focal    ASSESSMENT/PLAN:  HPI: 89yo Female Hx HTN, HLD, GERD, IBS, s/p TAVR presenting with complaints of R hip pain s/p fall. Now presented with asymptomatic elevated blood pressure and chronic constipation.     1. Impression: Asymptomatic elevated blood pressure   > Patient given her home dose of Amlodipine and the equivalent dose of Olmesartan Medoxomil, which is Losartan.  > On reassessment patients blood pressure improved to 143/66 mm/Hg.   > Continue current management plan.  > Continue to monitor patient and vitals closely.  > F/u with primary care team in AM.    2. Impression: Chronic Constipation  > Miralax PO ordered daily.   > Continue to monitor for bowel movement.   > If no improvement, consider further imaging.   > Continue current management plan.  > Continue to monitor patient and vitals closely.  > F/u with primary care team in AM.    [place]ese  services used for the encounter,  name [place], ID# [id number].     Chen Govea PA-C  Department of Medicine  #[id number] Episodic Note    Citizen of [place]  services used for the encounter,  name [place], ID# [id number].     Notified by RN that patient with blood pressure of 176/63 mm/Hg @ 22:04 on 10/12/2020. Attending son at bedside, who him and the patient report that she did not receive her hypertensive medications on 10/12/2020. Patient at home takes Amlodipine 2.5 mg PO daily and Olmesartan Medoxomil 20 mg PO once a day. Patient seen and evaluated at bedside, in NAD. Patient c/o chronic constipation, but otherwise asymptomatic.     Vital Signs Last 24 Hrs  T(C): 37.3 (12 Oct 2020 20:04), Max: 37.3 (12 Oct 2020 20:04)  T(F): 99.2 (12 Oct 2020 20:04), Max: 99.2 (12 Oct 2020 20:04)  HR: 71 (12 Oct 2020 22:08) (70 - 77)  BP: 143/66 (12 Oct 2020 22:08) (143/66 - 177/75)  RR: 18 (12 Oct 2020 20:04) (18 - 18)  SpO2: 96% (12 Oct 2020 20:04) (95% - 96%)    PHYSICAL EXAM:  Constitutional: alert   Cardiovascular: Normal S1 S2, No JVD, No murmurs, No edema  Respiratory: Lungs clear to auscultation	  Psychiatry: A & O x 3, Mood & affect appropriate  Gastrointestinal:  Soft, Non-tender	  Neurologic: Non-focal    ASSESSMENT/PLAN:  HPI: 89yo Female Hx HTN, HLD, GERD, IBS, s/p TAVR presenting with complaints of R hip pain s/p fall. Now presented with asymptomatic elevated blood pressure and chronic constipation.     1. Impression: Asymptomatic elevated blood pressure   > Patient given her home dose of Amlodipine and the equivalent dose of Olmesartan Medoxomil, which is Losartan. Patient started on these medications standing. Dr. Oates aware.  > On reassessment patients blood pressure improved to 143/66 mm/Hg.   > Continue current management plan.  > Continue to monitor patient and vitals closely.  > F/u with primary care team in AM.    2. Impression: Chronic Constipation  > Miralax PO ordered daily.   > Continue to monitor for bowel movement.   > If no improvement, consider further imaging. Dr. Oates aware. Per Dr. Oates, no need for imaging at this time.   > Continue current management plan.  > Continue to monitor patient and vitals closely.  > F/u with primary care team in AM.    Citizen of [place]  services used for the encounter,  name [place], ID# [id number].     Chen Govea PA-C  Department of Medicine  #[id number]

## 2020-10-13 NOTE — PROGRESS NOTE ADULT - ASSESSMENT
89yo Female Hx HTN, HLD, GERD, IBS, s/p TAVR presenting with complaints of R hip pain s/p fall.       Problem/Plan - 1:  ·  Problem: Hip pain, left.  Plan: 2/2 pubic rami fractures  analgesics  pt  ortho eval noted  wbat     Problem/Plan - 2:  ·  Problem: Hypotension,/vasovagal while trying to get  up sec to pain  resolved  resume her htn meds        Problem/Plan - 3:  ·  Problem: Essential hypertension.   c/w htn meds and monitor     Problem/Plan - 4:  ·  Problem: Hyperlipidemia, unspecified hyperlipidemia type.  c/w meds      dvt proph  gi proph     rehab planning

## 2020-10-14 ENCOUNTER — TRANSCRIPTION ENCOUNTER (OUTPATIENT)
Age: 86
End: 2020-10-14

## 2020-10-14 VITALS
HEART RATE: 95 BPM | OXYGEN SATURATION: 95 % | RESPIRATION RATE: 18 BRPM | TEMPERATURE: 98 F | DIASTOLIC BLOOD PRESSURE: 68 MMHG | SYSTOLIC BLOOD PRESSURE: 124 MMHG

## 2020-10-14 PROCEDURE — 71250 CT THORAX DX C-: CPT

## 2020-10-14 PROCEDURE — 80053 COMPREHEN METABOLIC PANEL: CPT

## 2020-10-14 PROCEDURE — 83690 ASSAY OF LIPASE: CPT

## 2020-10-14 PROCEDURE — 85018 HEMOGLOBIN: CPT

## 2020-10-14 PROCEDURE — 97116 GAIT TRAINING THERAPY: CPT

## 2020-10-14 PROCEDURE — 83735 ASSAY OF MAGNESIUM: CPT

## 2020-10-14 PROCEDURE — 99285 EMERGENCY DEPT VISIT HI MDM: CPT | Mod: 25

## 2020-10-14 PROCEDURE — 82435 ASSAY OF BLOOD CHLORIDE: CPT

## 2020-10-14 PROCEDURE — 80048 BASIC METABOLIC PNL TOTAL CA: CPT

## 2020-10-14 PROCEDURE — 73552 X-RAY EXAM OF FEMUR 2/>: CPT

## 2020-10-14 PROCEDURE — U0003: CPT

## 2020-10-14 PROCEDURE — 74176 CT ABD & PELVIS W/O CONTRAST: CPT

## 2020-10-14 PROCEDURE — 81003 URINALYSIS AUTO W/O SCOPE: CPT

## 2020-10-14 PROCEDURE — 85014 HEMATOCRIT: CPT

## 2020-10-14 PROCEDURE — 85025 COMPLETE CBC W/AUTO DIFF WBC: CPT

## 2020-10-14 PROCEDURE — 82330 ASSAY OF CALCIUM: CPT

## 2020-10-14 PROCEDURE — 84132 ASSAY OF SERUM POTASSIUM: CPT

## 2020-10-14 PROCEDURE — 71045 X-RAY EXAM CHEST 1 VIEW: CPT

## 2020-10-14 PROCEDURE — 82803 BLOOD GASES ANY COMBINATION: CPT

## 2020-10-14 PROCEDURE — 82553 CREATINE MB FRACTION: CPT

## 2020-10-14 PROCEDURE — 80307 DRUG TEST PRSMV CHEM ANLYZR: CPT

## 2020-10-14 PROCEDURE — 84295 ASSAY OF SERUM SODIUM: CPT

## 2020-10-14 PROCEDURE — 82550 ASSAY OF CK (CPK): CPT

## 2020-10-14 PROCEDURE — 76377 3D RENDER W/INTRP POSTPROCES: CPT

## 2020-10-14 PROCEDURE — 97161 PT EVAL LOW COMPLEX 20 MIN: CPT

## 2020-10-14 PROCEDURE — 72170 X-RAY EXAM OF PELVIS: CPT

## 2020-10-14 PROCEDURE — 76705 ECHO EXAM OF ABDOMEN: CPT

## 2020-10-14 PROCEDURE — 97530 THERAPEUTIC ACTIVITIES: CPT

## 2020-10-14 PROCEDURE — 93005 ELECTROCARDIOGRAM TRACING: CPT

## 2020-10-14 PROCEDURE — 85730 THROMBOPLASTIN TIME PARTIAL: CPT

## 2020-10-14 PROCEDURE — 86769 SARS-COV-2 COVID-19 ANTIBODY: CPT

## 2020-10-14 PROCEDURE — 84484 ASSAY OF TROPONIN QUANT: CPT

## 2020-10-14 PROCEDURE — 82962 GLUCOSE BLOOD TEST: CPT

## 2020-10-14 PROCEDURE — 85610 PROTHROMBIN TIME: CPT

## 2020-10-14 PROCEDURE — 73521 X-RAY EXAM HIPS BI 2 VIEWS: CPT

## 2020-10-14 PROCEDURE — 82565 ASSAY OF CREATININE: CPT

## 2020-10-14 PROCEDURE — 83605 ASSAY OF LACTIC ACID: CPT

## 2020-10-14 PROCEDURE — 85027 COMPLETE CBC AUTOMATED: CPT

## 2020-10-14 PROCEDURE — 72192 CT PELVIS W/O DYE: CPT

## 2020-10-14 PROCEDURE — 82947 ASSAY GLUCOSE BLOOD QUANT: CPT

## 2020-10-14 RX ORDER — POLYETHYLENE GLYCOL 3350 17 G/17G
17 POWDER, FOR SOLUTION ORAL ONCE
Refills: 0 | Status: DISCONTINUED | OUTPATIENT
Start: 2020-10-14 | End: 2020-10-14

## 2020-10-14 RX ORDER — SENNA PLUS 8.6 MG/1
2 TABLET ORAL
Qty: 0 | Refills: 0 | DISCHARGE
Start: 2020-10-14

## 2020-10-14 RX ORDER — ACETAMINOPHEN 500 MG
2 TABLET ORAL
Qty: 0 | Refills: 0 | DISCHARGE
Start: 2020-10-14

## 2020-10-14 RX ORDER — PANTOPRAZOLE SODIUM 20 MG/1
1 TABLET, DELAYED RELEASE ORAL
Qty: 0 | Refills: 0 | DISCHARGE
Start: 2020-10-14

## 2020-10-14 RX ORDER — POLYETHYLENE GLYCOL 3350 17 G/17G
17 POWDER, FOR SOLUTION ORAL
Qty: 0 | Refills: 0 | DISCHARGE
Start: 2020-10-14

## 2020-10-14 RX ORDER — SENNA PLUS 8.6 MG/1
2 TABLET ORAL AT BEDTIME
Refills: 0 | Status: DISCONTINUED | OUTPATIENT
Start: 2020-10-14 | End: 2020-10-14

## 2020-10-14 RX ADMIN — AMIODARONE HYDROCHLORIDE 100 MILLIGRAM(S): 400 TABLET ORAL at 06:06

## 2020-10-14 RX ADMIN — HEPARIN SODIUM 5000 UNIT(S): 5000 INJECTION INTRAVENOUS; SUBCUTANEOUS at 17:11

## 2020-10-14 RX ADMIN — Medication 81 MILLIGRAM(S): at 13:20

## 2020-10-14 RX ADMIN — LOSARTAN POTASSIUM 50 MILLIGRAM(S): 100 TABLET, FILM COATED ORAL at 08:22

## 2020-10-14 RX ADMIN — POLYETHYLENE GLYCOL 3350 17 GRAM(S): 17 POWDER, FOR SOLUTION ORAL at 13:20

## 2020-10-14 RX ADMIN — SIMETHICONE 80 MILLIGRAM(S): 80 TABLET, CHEWABLE ORAL at 06:06

## 2020-10-14 RX ADMIN — SIMETHICONE 80 MILLIGRAM(S): 80 TABLET, CHEWABLE ORAL at 17:13

## 2020-10-14 RX ADMIN — Medication 650 MILLIGRAM(S): at 10:09

## 2020-10-14 RX ADMIN — PANTOPRAZOLE SODIUM 40 MILLIGRAM(S): 20 TABLET, DELAYED RELEASE ORAL at 13:20

## 2020-10-14 RX ADMIN — HEPARIN SODIUM 5000 UNIT(S): 5000 INJECTION INTRAVENOUS; SUBCUTANEOUS at 06:06

## 2020-10-14 RX ADMIN — AMLODIPINE BESYLATE 2.5 MILLIGRAM(S): 2.5 TABLET ORAL at 06:06

## 2020-10-14 NOTE — DISCHARGE NOTE PROVIDER - NSDCMRMEDTOKEN_GEN_ALL_CORE_FT
acetaminophen 325 mg oral tablet: 2 tab(s) orally every 6 hours, As needed, Mild Pain (1 - 3), Moderate Pain (4 - 6)  AMIODARONE HYDROCHLORIDE  200 MG TABS: 0.5 tab(s) orally once a day  AMLODIPINE BESYLATE 2.5 MG TAB: 1 tab(s) orally once a day  Aspirin Enteric Coated 81 mg oral delayed release tablet: 1 tab(s) orally once a day  Gas-X 80 mg oral tablet, chewable: 1 tab(s) orally once a day, As Needed  linaclotide 72 mcg oral capsule: 1 cap(s) orally once a day, As Needed  OLMESARTAN MEDOXOMIL 20 MG TAB: 1 tab(s) orally once a day  pantoprazole 40 mg oral delayed release tablet: 1 tab(s) orally once a day  polyethylene glycol 3350 oral powder for reconstitution: 17 gram(s) orally once a day  ROSUVASTATIN CALCIUM 5 MG TAB: 1 tab(s) orally once a day  senna oral tablet: 2 tab(s) orally once a day (at bedtime)

## 2020-10-14 NOTE — DISCHARGE NOTE PROVIDER - NSDCCPCAREPLAN_GEN_ALL_CORE_FT
PRINCIPAL DISCHARGE DIAGNOSIS  Diagnosis: Hip pain, left  Assessment and Plan of Treatment: s/p fall, developed left superior and inferior rami fractures. As per Orthopedic, no surgical intervention required.      SECONDARY DISCHARGE DIAGNOSES  Diagnosis: Essential hypertension  Assessment and Plan of Treatment: Low salt diet  Activity as tolerated.  Take all medication as prescribed.  Follow up with your medical doctor for routine blood pressure monitoring at your next visit.  Notify your doctor if you have any of the following symptoms:   Dizziness, Lightheadedness, Blurry vision, Headache, Chest pain, Shortness of breath      Diagnosis: Hypotension, unspecified hypotension type  Assessment and Plan of Treatment: Hypotension,/vasovagal while trying to get  up secondary  to pain  now resolved      Diagnosis: Hyperlipidemia, unspecified hyperlipidemia type  Assessment and Plan of Treatment: Continue with your cholesterol medications. Eat a heart healthy diet that is low in saturated fats and salt, and includes whole grains, fruits, vegetables and lean protein; exercise regularly (consult with your physician or cardiologist first); maintain a heart healthy weight; if you smoke - quit (A resource to help you stop smoking is the [facility] – phone number [phone].). Continue to follow with your primary physician or cardiologist.  Seek medical help for dizziness, Lightheadedness, Blurry vision, Headache, Chest pain, Shortness of breath

## 2020-10-14 NOTE — DISCHARGE NOTE NURSING/CASE MANAGEMENT/SOCIAL WORK - PATIENT PORTAL LINK FT
You can access the FollowMyHealth Patient Portal offered by [facility] by registering at the following website: [email or web address]. By joining [employer]’s FollowMyHealth portal, you will also be able to view your health information using other applications (apps) compatible with our system.

## 2020-10-14 NOTE — PROGRESS NOTE ADULT - ASSESSMENT
89yo Female Hx HTN, HLD, GERD, IBS, s/p TAVR presenting with complaints of R hip pain s/p fall.       Problem/Plan - 1:  ·  Problem: Hip pain, left.  Plan: 2/2 pubic rami fractures  analgesics  pt  ortho eval noted  wbat     Problem/Plan - 2:  ·  Problem: Hypotension,/vasovagal while trying to get  up sec to pain  resolved  resumed her htn meds        Problem/Plan - 3:  ·  Problem: Essential hypertension.   c/w htn meds and monitor     Problem/Plan - 4:  ·  Problem: Hyperlipidemia, unspecified hyperlipidemia type.  c/w meds      dvt proph  gi proph     constipation  bowel regimen    rehab planning

## 2020-10-14 NOTE — DISCHARGE NOTE PROVIDER - CARE PROVIDER_API CALL
Nishant Arroyo  ORTHOPAEDIC SURGERY  [address] [place] [address], [address]  [place] [address]  Phone: [phone]  Fax: [phone]  Follow Up Time:

## 2021-11-21 NOTE — PROGRESS NOTE ADULT - SUBJECTIVE AND OBJECTIVE BOX
CHIEF COMPLAINT:Patient is a 89y old  Female who presents with a chief complaint of Hypotension, pubic rami fractures (13 Oct 2020 11:01)    	        PAST MEDICAL & SURGICAL HISTORY:  Hyperlipidemia    Hypertension    GERD (gastroesophageal reflux disease)    IBS (irritable bowel syndrome)    Fx distal radius NEC-closed    S/P TAVR (transcatheter aortic valve replacement)    H/O partial thyroidectomy  1970 benign nodules    knee replacement  right 2006            REVIEW OF SYSTEMS:  weak  NECK: No pain or stiffness  RESPIRATORY: No cough, wheezing, chills or hemoptysis; No Shortness of Breath  CARDIOVASCULAR: No chest pain, palpitations, passing out, dizziness, or leg swelling  GASTROINTESTINAL: No abdominal or epigastric pain. No nausea, vomiting, or hematemesis;   constipated  GENITOURINARY: No dysuria, frequency, hematuria, or incontinence  NEUROLOGICAL: No headaches, memory loss  l sided groin pain     Medications:  MEDICATIONS  (STANDING):  aMIOdarone    Tablet 100 milliGRAM(s) Oral daily  amLODIPine   Tablet 2.5 milliGRAM(s) Oral daily  aspirin enteric coated 81 milliGRAM(s) Oral daily  heparin   Injectable 5000 Unit(s) SubCutaneous every 12 hours  losartan 50 milliGRAM(s) Oral daily  pantoprazole    Tablet 40 milliGRAM(s) Oral daily  polyethylene glycol 3350 17 Gram(s) Oral once  polyethylene glycol 3350 17 Gram(s) Oral daily  rosuvastatin 5 milliGRAM(s) Oral at bedtime  senna 2 Tablet(s) Oral at bedtime  simethicone 80 milliGRAM(s) Chew two times a day    MEDICATIONS  (PRN):  acetaminophen   Tablet .. 650 milliGRAM(s) Oral every 6 hours PRN Mild Pain (1 - 3), Moderate Pain (4 - 6)    	    PHYSICAL EXAM:  T(C): 36.5 (10-14-20 @ 04:18), Max: 36.7 (10-13-20 @ 12:36)  HR: 66 (10-14-20 @ 04:18) (60 - 67)  BP: 161/70 (10-14-20 @ 04:18) (115/64 - 161/70)  RR: 18 (10-14-20 @ 04:18) (18 - 18)  SpO2: 95% (10-14-20 @ 04:18) (94% - 96%)  Wt(kg): --  I&O's Summary    13 Oct 2020 07:01  -  14 Oct 2020 07:00  --------------------------------------------------------  IN: 1080 mL / OUT: 1250 mL / NET: -170 mL        Appearance: Normal	  HEENT:   Normal oral mucosa, PERRL, EOMI	  Lymphatic: No lymphadenopathy  Cardiovascular: Normal S1 S2, No JVD, No murmurs, No edema  Respiratory: Lungs clear to auscultation	  Psychiatry: A & O x 3, Mood & affect appropriate  Gastrointestinal:  Soft, Non-tender, + BS	  Skin: No rashes, No ecchymoses, No cyanosis	  Neurologic: Non-focal  Extremities: dec rom llext sec to pain  Vascular: Peripheral pulses palpable 2+ bilaterally    TELEMETRY: 	    ECG:  	  RADIOLOGY:  OTHER: 	  	  LABS:	 	    CARDIAC MARKERS:                  proBNP:   Lipid Profile:   HgA1c:   TSH:     	         21-Nov-2021 20:32

## 2023-11-29 NOTE — PHYSICAL THERAPY INITIAL EVALUATION ADULT - GAIT DEVIATIONS NOTED, PT EVAL
Detail Level: Zone
decreased step length/decreased velocity of limb motion/decreased weight-shifting ability/decreased daniela

## 2024-03-22 NOTE — PATIENT PROFILE ADULT - HOW PATIENT ADDRESSED, PROFILE
Follow-up with your primary care physician in one week if symptoms have not improved or symptoms are starting to get worse.  Increase fluids, keep well-hydrated.  Take Tylenol and Motrin for fever and pain.  Increase vitamin C  Return to the emergency room for symptoms or concerns   Maryam

## 2025-02-03 NOTE — ED ADULT NURSE REASSESSMENT NOTE - ANCILLARY STATUS
[facility] amlodip[facility] is 5 mg one time daily.  
awaiting radiology/CT
physician at bedside

## 2025-07-08 ENCOUNTER — APPOINTMENT (OUTPATIENT)
Age: 89
End: 2025-07-08

## 2025-07-30 ENCOUNTER — NON-APPOINTMENT (OUTPATIENT)
Age: 89
End: 2025-07-30

## 2025-07-30 ENCOUNTER — APPOINTMENT (OUTPATIENT)
Age: 89
End: 2025-07-30
Payer: MEDICARE

## 2025-07-30 PROCEDURE — 92134 CPTRZ OPH DX IMG PST SGM RTA: CPT

## 2025-07-30 PROCEDURE — 92014 COMPRE OPH EXAM EST PT 1/>: CPT

## 2025-07-30 PROCEDURE — 92202 OPSCPY EXTND ON/MAC DRAW: CPT

## 2025-07-30 PROCEDURE — 92015 DETERMINE REFRACTIVE STATE: CPT
